# Patient Record
Sex: MALE | Race: WHITE | NOT HISPANIC OR LATINO
[De-identification: names, ages, dates, MRNs, and addresses within clinical notes are randomized per-mention and may not be internally consistent; named-entity substitution may affect disease eponyms.]

---

## 2019-08-19 PROBLEM — Z00.00 ENCOUNTER FOR PREVENTIVE HEALTH EXAMINATION: Status: ACTIVE | Noted: 2019-08-19

## 2019-08-26 ENCOUNTER — APPOINTMENT (OUTPATIENT)
Dept: GASTROENTEROLOGY | Facility: CLINIC | Age: 39
End: 2019-08-26
Payer: COMMERCIAL

## 2019-08-26 VITALS
DIASTOLIC BLOOD PRESSURE: 78 MMHG | WEIGHT: 225 LBS | BODY MASS INDEX: 29.82 KG/M2 | SYSTOLIC BLOOD PRESSURE: 124 MMHG | HEART RATE: 53 BPM | HEIGHT: 73 IN

## 2019-08-26 DIAGNOSIS — K58.0 IRRITABLE BOWEL SYNDROME WITH DIARRHEA: ICD-10-CM

## 2019-08-26 PROCEDURE — 99204 OFFICE O/P NEW MOD 45 MIN: CPT

## 2019-08-26 NOTE — ASSESSMENT
[FreeTextEntry1] : Strongly suspect IBS-D given his negative endoscopic workup (which did not include an EGD), and negative lab results, in addition to improvement on a restrictive diet.\par \par Do not see that an EGD is necessary at this time.\par \par Will give a trial of a low FODMAP diet.  Provided handouts explaining the low FODMAP diet, in addition to foods to avoid.  I have explained that naturally occurring sugars can be broken down in the gut into gasses.  I have explained that many of these naturally occurring sugars are in otherwise healthy foods.  I have explained that this diet may take 1-2 weeks prior to noticing a significant improvement in symptoms.

## 2019-08-26 NOTE — HISTORY OF PRESENT ILLNESS
[FreeTextEntry1] : Mr. Brownlee is a pleasant 39M h/o HLD who presents for a second opinion for diarrhea.  He has been experiencing frequent diarrhea ove the past 2 months, 5-10 times a day.  He has seen mucus in his stool, no blood, no black stool.  No weight loss over this time.\par \par He was initially seen by Dr. Phoenix Joseph, who is also his PCP.  He had labs drawn on 6/25/19 (see scanned results for details):\par Normal TSH/T4\par Normal CBD\par ESR 8\par Negative celiac markers\par Normal CMET\par \par He underwent a colonoscopy on 8/5/19 with unremarkable results, multiple biopsies taken to r/o microscopic colitis, which were negative for abnormalities (see scanned report for details).\par \par He was prescribed hyoscyamine with mild improvement in symptoms, however this made him drowsy.\par \par No recent abx, no OTC medications, no recent travel.\par \par Had stool studies which were negative for infectious causes.\par \par He recently altered his diet, has been eating very bland food including white bread, bananas, white rice, only cooked vegetables, no coffee, no dairy.  He had a significant improvement in his symptoms on this diet, however does not feel that it is sustainable for the long term.  When he strayed from this diet at a wedding, he had a recurrence of his symptoms.

## 2019-12-24 ENCOUNTER — APPOINTMENT (OUTPATIENT)
Dept: PAIN MANAGEMENT | Facility: CLINIC | Age: 39
End: 2019-12-24
Payer: COMMERCIAL

## 2019-12-24 VITALS
DIASTOLIC BLOOD PRESSURE: 84 MMHG | SYSTOLIC BLOOD PRESSURE: 130 MMHG | HEIGHT: 73 IN | BODY MASS INDEX: 29.82 KG/M2 | WEIGHT: 225 LBS

## 2019-12-24 DIAGNOSIS — M54.12 RADICULOPATHY, CERVICAL REGION: ICD-10-CM

## 2019-12-24 DIAGNOSIS — M79.18 MYALGIA, OTHER SITE: ICD-10-CM

## 2019-12-24 DIAGNOSIS — M54.2 CERVICALGIA: ICD-10-CM

## 2019-12-24 PROCEDURE — 99244 OFF/OP CNSLTJ NEW/EST MOD 40: CPT

## 2019-12-24 PROCEDURE — 99204 OFFICE O/P NEW MOD 45 MIN: CPT

## 2019-12-24 RX ORDER — ROSUVASTATIN CALCIUM 5 MG/1
5 TABLET, FILM COATED ORAL
Refills: 0 | Status: ACTIVE | COMMUNITY

## 2019-12-24 NOTE — CONSULT LETTER
[Dear  ___] : Dear  [unfilled], [Please see my note below.] : Please see my note below. [Consult Letter:] : I had the pleasure of evaluating your patient, [unfilled]. [Consult Closing:] : Thank you very much for allowing me to participate in the care of this patient.  If you have any questions, please do not hesitate to contact me. [Sincerely,] : Sincerely, [FreeTextEntry3] : \par EDIL Mccallum DO\par Director, Pain Management Center\par  of Anesthesiology\par [facility] [place]\par \par \par

## 2019-12-24 NOTE — REVIEW OF SYSTEMS
[Feeling Tired] : fatigue [Back Pain] : back pain [Negative] : Endocrine [FreeTextEntry5] : chest pain, pressure

## 2019-12-24 NOTE — REASON FOR VISIT
[Initial Consultation] : an initial pain management consultation [FreeTextEntry2] : referred by Dr. Joseph for evaluation of back pain

## 2019-12-24 NOTE — PHYSICAL EXAM
[Normal muscle bulk without asymmetry] : normal muscle bulk without asymmetry [Facet Tenderness] : facet tenderness [Spine: Flexion to ___ degrees, without pain] : spine: flexion to [unfilled] degrees, without pain [] : Motor: [NL] : normal and symmetric bilaterally [de-identified] : Constitutional: Normal, well developed, no acute distress\par Eyes: Symmetric, External structures \par Oropharynx: Lips normal, symmetric, no external lesions appreciated\par Respiratory: Non-labored breathing, no audible wheezes\par Cardiac: Pulse palpated, no tachycardia\par Vascular: No cyanosis appreciated, no edema in bilateral lower extremities\par GI: Nondistended, no jaundice appreciated\par Neurovascular: CN2-12 grossly intact, Alert and oriented\par MSK: Normal muscle bulk, 5/5 Motor strength B/L in LE\par \par

## 2019-12-24 NOTE — HISTORY OF PRESENT ILLNESS
[Back Pain] : back pain [5] : an average pain level of 5/10 [___ mths] : [unfilled] month(s) ago [Sharp] : sharp [Aching] : aching [Shooting] : shooting [Heat] : heat [de-identified] : pins and needles  [FreeTextEntry1] : HPI\par \par Mr. LITTLE DREW is a 39 year M otherwise healthy, presents with right neck pain radiating to right neck pain radiating to right shoulder, mid back.  Pain is intermittent without inciting event.  denies any worsening numbness, weakness, bowel/bladder dysfunction\par \par \par Previous and current pain medications/doses/effects:\par \par Advil with improvement\par \par Previous Pain Treatments:\par \par Chiropractic treatment with improvement\par Acupuncture with improvement\par \par Previous Pain Injections:\par \par n/a\par \par Previous Diagnostic Studies/Images:\par \par n/a\par \par  [FreeTextEntry3] : sleeping  [FreeTextEntry4] : acupuncture, massages

## 2019-12-24 NOTE — ASSESSMENT
[FreeTextEntry1] : cervical and shoulder pain likely secondary to myofascial pain with component of cervical radiculopathy/spondylosis\par \par trial PT - referral provided\par \par may consider imaging\par \par trial voltaren prn pain\par \par \par The above diagnosis and treatment plan is medically reasonable and necessary based on the patient encounter.\par Opiod contract signed\par There were no barriers to communication.\par Informed patient that I would be available for any additional questions.\par Patient was instructed to call with any worsening symptoms including severe pain, new numbness/weakness, or changes in the bowel/bladder function. \par \par Discussed role of nsaids in pain management and all relevant risks, if patient is continuing to require after 4 weeks the patient should f/u for alternative treatment. \par \par Instructed patient to maintain pain diary to monitor pain level, mobility, and function.\par \par The referring provider was informed of the above diagnosis and treatment plan.\par

## 2020-02-18 ENCOUNTER — RX RENEWAL (OUTPATIENT)
Age: 40
End: 2020-02-18

## 2020-02-18 RX ORDER — DICLOFENAC SODIUM 10 MG/G
1 GEL TOPICAL
Qty: 100 | Refills: 1 | Status: ACTIVE | COMMUNITY
Start: 2019-12-24 | End: 1900-01-01

## 2021-11-10 ENCOUNTER — APPOINTMENT (OUTPATIENT)
Dept: NEUROLOGY | Facility: CLINIC | Age: 41
End: 2021-11-10
Payer: COMMERCIAL

## 2021-11-10 ENCOUNTER — NON-APPOINTMENT (OUTPATIENT)
Age: 41
End: 2021-11-10

## 2021-11-10 VITALS
HEIGHT: 73 IN | DIASTOLIC BLOOD PRESSURE: 92 MMHG | WEIGHT: 220 LBS | TEMPERATURE: 97.1 F | BODY MASS INDEX: 29.16 KG/M2 | HEART RATE: 53 BPM | SYSTOLIC BLOOD PRESSURE: 133 MMHG

## 2021-11-10 DIAGNOSIS — M47.812 SPONDYLOSIS W/OUT MYELOPATHY OR RADICULOPATHY, CERVICAL REGION: ICD-10-CM

## 2021-11-10 DIAGNOSIS — G44.86 CERVICOGENIC HEADACHE: ICD-10-CM

## 2021-11-10 PROCEDURE — 99204 OFFICE O/P NEW MOD 45 MIN: CPT

## 2021-11-10 RX ORDER — IBUPROFEN 800 MG/1
800 TABLET, FILM COATED ORAL EVERY 8 HOURS
Qty: 60 | Refills: 1 | Status: ACTIVE | COMMUNITY
Start: 2021-11-10 | End: 1900-01-01

## 2021-11-10 RX ORDER — TIZANIDINE 4 MG/1
4 TABLET ORAL TWICE DAILY
Qty: 60 | Refills: 0 | Status: ACTIVE | COMMUNITY
Start: 2021-11-10 | End: 1900-01-01

## 2021-11-16 NOTE — REVIEW OF SYSTEMS
[Feeling Tired] : feeling tired [Fever] : no fever [Anxiety] : no anxiety [Depression] : no depression [Numbness] : no numbness [Tingling] : no tingling [Eye Pain] : no eye pain [Red Eyes] : eyes not red [Loss Of Hearing] : no hearing loss [Chest Pain] : no chest pain [Palpitations] : no palpitations [Abdominal Pain] : no abdominal pain [Diarrhea] : no diarrhea [Joint Pain] : no joint pain [Itching] : no itching [Muscle Weakness] : no muscle weakness [Easy Bruising] : no tendency for easy bruising [FreeTextEntry5] : Sometimes difficulty breathing

## 2021-11-16 NOTE — ASSESSMENT
[FreeTextEntry1] : Neurologic examination is normal.  He does have some tenderness to palpation over the right occipital notch as well as trapezius muscle.  I suspect a cervicogenic component to his pain based on the fact that he usually notes it on the right side.  Imaging as outlined below.\par \par For severe discomfort, he can try ibuprofen along with a muscle relaxer.  He is willing to try physical therapy.\par \par We discussed the role of anxiety and pain and I suggested some cognitive behavioral therapy.\par \par He can see me in 2 months.

## 2021-11-16 NOTE — HISTORY OF PRESENT ILLNESS
[FreeTextEntry1] : This is a 41-year-old man who is being seen in neurologic consultation for evaluation of headaches.  Patient describes a right posterior occipital headache.  It radiates to involve the crown of the head on the left side.  He describes a throbbing pulsating sensation.  There is no nausea, vomiting, significant light or sound sensitivity.  For 2 to 3 weeks he rated his headache as an 8 on his personal pain scale.  At that time ibuprofen and Tylenol did not help.  He notes no significant neck pain or shoulder pain at the onset.  He had gone to [facility] and CT of the head was negative.\par \par  He notes that he clenches a lot.  His dentist is working on getting him a  or brace.\par \par He has no history of headaches or family history of headaches.\par \par He does note a lot of stress in his life.\par He drinks about a gallon of water a day.  He drinks 2-3 coffees usually in the morning.  He does not sleep well.  He notes a lot of stress because his young son has type 1 diabetes.\par \par He went to [facility] and had a CT of the head which was negative.  He is doing cardiovascular exercises daily.  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  At present he does note some neck discomfort.  No radiation of the pain.  He has done chiropractic manipulation without sustained relief.

## 2022-01-06 ENCOUNTER — APPOINTMENT (OUTPATIENT)
Dept: NEUROLOGY | Facility: CLINIC | Age: 42
End: 2022-01-06

## 2022-07-26 NOTE — PHYSICAL EXAM
Staff, please call pharmacy.  See med list. [FreeTextEntry1] : Physical examination \par General: No acute distress, Awake, Alert. \par Fundus: Unable\par Neck: no Carotid bruit. \par Cardiovascular: Normal rate, Regular rhythm, No murmur. \par Chest - clear bilaterally\par \par Mental status \par Awake, alert, and oriented to person, time and place, Normal attention span and concentration, Recent and remote memory intact, Language intact, Fund of knowledge intact. \par Cranial Nerves \par II: VFF \par III, IV, VI: PERRL, EOMI. \par V: Facial sensation is normal B/L. \par VII: Facial strength is normal B/L. \par VIII: Gross hearing is intact. \par IX, X: Palate is midline and elevates symmetrically. \par XI: Trapezius normal strength. \par XII: Tongue midline without atrophy or fasciculations. \par \par Motor exam \par Muscle tone - no evidence of rigidity or resistance in all 4 extremities. \par No atrophy or fasciculations \par Muscle Strength: arms and legs, proximal and distal flexors and extensors are normal \par No UE drift.\par \par Reflexes \par All present, normal, and symmetrical. \par Plantars right: mute. \par Plantars left: mute. \par \par \par Coordination \par Finger to nose: Normal. \par Heel to shin: Normal. \par \par Gait \par Normal\par \par Other\par Right occipital notch tenderness to palpation, trapezius muscle spasm.

## 2024-12-26 ENCOUNTER — APPOINTMENT (OUTPATIENT)
Dept: VASCULAR SURGERY | Facility: CLINIC | Age: 44
End: 2024-12-26